# Patient Record
Sex: MALE | Race: WHITE
[De-identification: names, ages, dates, MRNs, and addresses within clinical notes are randomized per-mention and may not be internally consistent; named-entity substitution may affect disease eponyms.]

---

## 2022-08-24 ENCOUNTER — HOSPITAL ENCOUNTER (EMERGENCY)
Dept: HOSPITAL 79 - ER1 | Age: 67
Discharge: LEFT BEFORE DISCHARGE | End: 2022-08-24
Payer: MEDICARE

## 2022-08-24 DIAGNOSIS — R10.9: ICD-10-CM

## 2022-08-24 DIAGNOSIS — M54.9: ICD-10-CM

## 2022-08-24 DIAGNOSIS — R53.1: Primary | ICD-10-CM

## 2022-08-24 DIAGNOSIS — R06.02: ICD-10-CM

## 2024-08-12 ENCOUNTER — TELEPHONE (OUTPATIENT)
Dept: FAMILY MEDICINE CLINIC | Age: 69
End: 2024-08-12

## 2024-08-12 NOTE — TELEPHONE ENCOUNTER
Spoke to hospice nurse, Sherry  Patient admitted to hospice  Currently using Belbuca  Decreased responsiveness  Belbuca started approximately a week ago  We will give consideration to switching over to morphine after Sherry's visit tomorrow    mas